# Patient Record
Sex: FEMALE | Race: WHITE
[De-identification: names, ages, dates, MRNs, and addresses within clinical notes are randomized per-mention and may not be internally consistent; named-entity substitution may affect disease eponyms.]

---

## 2020-01-12 ENCOUNTER — HOSPITAL ENCOUNTER (EMERGENCY)
Dept: HOSPITAL 47 - EC | Age: 35
Discharge: HOME | End: 2020-01-12
Payer: COMMERCIAL

## 2020-01-12 VITALS
DIASTOLIC BLOOD PRESSURE: 77 MMHG | HEART RATE: 92 BPM | TEMPERATURE: 101.7 F | RESPIRATION RATE: 15 BRPM | SYSTOLIC BLOOD PRESSURE: 117 MMHG

## 2020-01-12 DIAGNOSIS — I10: ICD-10-CM

## 2020-01-12 DIAGNOSIS — Z88.1: ICD-10-CM

## 2020-01-12 DIAGNOSIS — Z88.2: ICD-10-CM

## 2020-01-12 DIAGNOSIS — Z88.5: ICD-10-CM

## 2020-01-12 DIAGNOSIS — Z87.891: ICD-10-CM

## 2020-01-12 DIAGNOSIS — B34.9: Primary | ICD-10-CM

## 2020-01-12 LAB
ALBUMIN SERPL-MCNC: 4.2 G/DL (ref 3.5–5)
ALP SERPL-CCNC: 93 U/L (ref 38–126)
ALT SERPL-CCNC: 81 U/L (ref 4–34)
ANION GAP SERPL CALC-SCNC: 10 MMOL/L
AST SERPL-CCNC: 156 U/L (ref 14–36)
BASOPHILS # BLD AUTO: 0 K/UL (ref 0–0.2)
BASOPHILS NFR BLD AUTO: 1 %
BUN SERPL-SCNC: 10 MG/DL (ref 7–17)
CALCIUM SPEC-MCNC: 9 MG/DL (ref 8.4–10.2)
CHLORIDE SERPL-SCNC: 104 MMOL/L (ref 98–107)
CO2 SERPL-SCNC: 22 MMOL/L (ref 22–30)
EOSINOPHIL # BLD AUTO: 0.2 K/UL (ref 0–0.7)
EOSINOPHIL NFR BLD AUTO: 3 %
ERYTHROCYTE [DISTWIDTH] IN BLOOD BY AUTOMATED COUNT: 4.88 M/UL (ref 3.8–5.4)
ERYTHROCYTE [DISTWIDTH] IN BLOOD: 13 % (ref 11.5–15.5)
GLUCOSE SERPL-MCNC: 119 MG/DL (ref 74–99)
HCT VFR BLD AUTO: 39.6 % (ref 34–46)
HGB BLD-MCNC: 12.6 GM/DL (ref 11.4–16)
LYMPHOCYTES # SPEC AUTO: 0.2 K/UL (ref 1–4.8)
LYMPHOCYTES NFR SPEC AUTO: 4 %
MCH RBC QN AUTO: 25.8 PG (ref 25–35)
MCHC RBC AUTO-ENTMCNC: 31.8 G/DL (ref 31–37)
MCV RBC AUTO: 81.1 FL (ref 80–100)
MONOCYTES # BLD AUTO: 0.2 K/UL (ref 0–1)
MONOCYTES NFR BLD AUTO: 2 %
NEUTROPHILS # BLD AUTO: 6 K/UL (ref 1.3–7.7)
NEUTROPHILS NFR BLD AUTO: 90 %
PH UR: 6.5 [PH] (ref 5–8)
PLATELET # BLD AUTO: 243 K/UL (ref 150–450)
POTASSIUM SERPL-SCNC: 4.1 MMOL/L (ref 3.5–5.1)
PROT SERPL-MCNC: 7.3 G/DL (ref 6.3–8.2)
SODIUM SERPL-SCNC: 136 MMOL/L (ref 137–145)
SP GR UR: 1 (ref 1–1.03)
UROBILINOGEN UR QL STRIP: <2 MG/DL (ref ?–2)
WBC # BLD AUTO: 6.7 K/UL (ref 3.8–10.6)

## 2020-01-12 PROCEDURE — 83605 ASSAY OF LACTIC ACID: CPT

## 2020-01-12 PROCEDURE — 87040 BLOOD CULTURE FOR BACTERIA: CPT

## 2020-01-12 PROCEDURE — 71046 X-RAY EXAM CHEST 2 VIEWS: CPT

## 2020-01-12 PROCEDURE — 85025 COMPLETE CBC W/AUTO DIFF WBC: CPT

## 2020-01-12 PROCEDURE — 87186 SC STD MICRODIL/AGAR DIL: CPT

## 2020-01-12 PROCEDURE — 81003 URINALYSIS AUTO W/O SCOPE: CPT

## 2020-01-12 PROCEDURE — 36415 COLL VENOUS BLD VENIPUNCTURE: CPT

## 2020-01-12 PROCEDURE — 81025 URINE PREGNANCY TEST: CPT

## 2020-01-12 PROCEDURE — 86664 EPSTEIN-BARR NUCLEAR ANTIGEN: CPT

## 2020-01-12 PROCEDURE — 96375 TX/PRO/DX INJ NEW DRUG ADDON: CPT

## 2020-01-12 PROCEDURE — 86709 HEPATITIS A IGM ANTIBODY: CPT

## 2020-01-12 PROCEDURE — 99284 EMERGENCY DEPT VISIT MOD MDM: CPT

## 2020-01-12 PROCEDURE — 86663 EPSTEIN-BARR ANTIBODY: CPT

## 2020-01-12 PROCEDURE — 96361 HYDRATE IV INFUSION ADD-ON: CPT

## 2020-01-12 PROCEDURE — 86665 EPSTEIN-BARR CAPSID VCA: CPT

## 2020-01-12 PROCEDURE — 96374 THER/PROPH/DIAG INJ IV PUSH: CPT

## 2020-01-12 PROCEDURE — 80053 COMPREHEN METABOLIC PANEL: CPT

## 2020-01-12 PROCEDURE — 80074 ACUTE HEPATITIS PANEL: CPT

## 2020-01-12 PROCEDURE — 87077 CULTURE AEROBIC IDENTIFY: CPT

## 2020-01-12 PROCEDURE — 87502 INFLUENZA DNA AMP PROBE: CPT

## 2020-01-12 NOTE — ED
Abdominal Pain HPI





- General


Chief Complaint: Abdominal Pain


Stated Complaint: Urogenital


Time Seen by Provider: 01/12/20 19:55


Source: patient


Mode of arrival: ambulatory


Limitations: no limitations





- History of Present Illness


Initial Comments: 





Patient is a 34-year-old female presents emergency Department with complaints of

lower back discomfort as well as a fever.  Patient states she has been 

experiencing UTI-type symptoms for the past week and a half.  She did go to her 

doctor a week ago and was given a prescription for Bactrim however she is AL

LERGIC to it.  Patient states they did call her in Macrobid.  She has been 

taking Macrobid for 2 days and feels like her symptoms are worsening.  Patient 

developed a fever today.  She is also complaining of nausea, fatigue, body 

aches.  She has had history of kidney infections, no stones.  She denies any 

belly pain, vomiting.  She has no other complaints at this time.  Upon arrival 

to the ER, Patient was febrile to 102.6, pulse is 106, /89, 99% on room 

air.





- Related Data


                                  Previous Rx's











 Medication  Instructions  Recorded


 


Fluticasone Propionate [Flonase] 2 spray EA NOSTRIL DAILY #1 pkg 07/17/14


 


Ibuprofen [Motrin] 600 mg PO Q6HR PRN #20 tab 07/17/14











                                    Allergies











Allergy/AdvReac Type Severity Reaction Status Date / Time


 


ciprofloxacin [From Cipro] Allergy  Dyspnea Verified 01/12/20 19:48


 


ciprofloxacin HCl Allergy  Dyspnea Verified 01/12/20 19:48





[From Cipro]     


 


codeine Allergy  Rash/Hives Verified 01/12/20 19:48


 


hydrocodone Allergy  Dyspnea Verified 01/12/20 19:48


 


sulfamethoxazole Allergy  Swelling Verified 01/12/20 19:49





[From Bactrim]     


 


tramadol Allergy  Rash/Hives Verified 01/12/20 19:48


 


trimethoprim [From Bactrim] Allergy  Swelling Verified 01/12/20 19:49














Review of Systems


ROS Statement: 


Those systems with pertinent positive or pertinent negative responses have been 

documented in the HPI.





ROS Other: All systems not noted in ROS Statement are negative.





Past Medical History


Past Medical History: Hypertension


Additional Past Medical History / Comment(s): kidney infection


History of Any Multi-Drug Resistant Organisms: None Reported


Past Surgical History: Tubal Ligation


Past Psychological History: No Psychological Hx Reported


Smoking Status: Former smoker


Past Alcohol Use History: None Reported


Past Drug Use History: None Reported





General Exam





- General Exam Comments


Initial Comments: 





GENERAL: 


Patient looks fatigued, no acute distress.





HEAD: 


Atraumatic, normocephalic.





EYES:


Pupils equal round and reactive to light, extraocular movements intact, sclera 

anicteric, conjunctiva are normal.





ENT: 


TMs normal, nares patent, oropharynx clear without exudates.  Moist mucous 

membranes.





NECK: 


Normal range of motion, supple without lymphadenopathy or JVD.





LUNGS:


 Breath sounds clear to auscultation bilaterally and equal.  No wheezes rales or

rhonchi.





HEART:


Regular rate and rhythm without murmurs, rubs or gallops.





ABDOMEN: 


Soft, nontender, normoactive bowel sounds.  No guarding, no rebound.  No masses 

appreciated.  Mild bilateral lower back discomfort.





: Deferred 





EXTREMITIES: 


Normal range of motion, no pitting or edema.  No clubbing or cyanosis.





NEUROLOGICAL: 


Normal speech, normal gait.





PSYCH:


Normal mood, normal affect.





SKIN:


 Warm, Dry, normal turgor, no rashes or lesions noted.


Limitations: no limitations





Course


                                   Vital Signs











  01/12/20 01/12/20 01/12/20





  19:45 21:19 22:16


 


Temperature 102.6 F H 103.2 F H 101.7 F H


 


Pulse Rate 106 H 96 92


 


Respiratory 18 16 15





Rate   


 


Blood Pressure 138/89  117/77


 


O2 Sat by Pulse 99 98 99





Oximetry   














Medical Decision Making





- Medical Decision Making





Patient is a 34-year-old female presenting with lower back pain and UTI-type 

symptoms for the past one week.  Patient was started on Macrobid by her PCP but 

symptoms have increased.  Patient arrives to the ER today febrile and tachycar

rachael. Lab work is unremarkable, lactic acid is normal, UA is normal.  Liver 

enzymes are slightly elevated.  Influenza is negative.  Chest x-ray shows no 

acute abnormalities.  Patient was given fluids, Zofran, Toradol and reports some

improvement in her symptoms.  Her fever did come down some.  On reexamination, 

patient still has no belly pain.  I discussed with patient that this is most 

likely viral in nature.  Patient will continue with Motrin or Tylenol as needed 

for fever control.  She will continue to increase fluid intake.  She is in 

agreement with this plan and care.  Patient is stable for discharge at this 

time.  She'll follow up with her PCP if symptoms persist as well as repeat labs 

for her liver enzymes.  Return parameters were discussed with the patient she 

verbalized understanding.  Case discussed with Dr. Dhillon. 





- Lab Data


Result diagrams: 


                                 01/12/20 20:45





                                 01/12/20 20:45


                                   Lab Results











  01/12/20 01/12/20 01/12/20 Range/Units





  20:45 20:45 20:45 


 


WBC  6.7    (3.8-10.6)  k/uL


 


RBC  4.88    (3.80-5.40)  m/uL


 


Hgb  12.6    (11.4-16.0)  gm/dL


 


Hct  39.6    (34.0-46.0)  %


 


MCV  81.1    (80.0-100.0)  fL


 


MCH  25.8    (25.0-35.0)  pg


 


MCHC  31.8    (31.0-37.0)  g/dL


 


RDW  13.0    (11.5-15.5)  %


 


Plt Count  243    (150-450)  k/uL


 


Neutrophils %  90    %


 


Lymphocytes %  4    %


 


Monocytes %  2    %


 


Eosinophils %  3    %


 


Basophils %  1    %


 


Neutrophils #  6.0    (1.3-7.7)  k/uL


 


Lymphocytes #  0.2 L    (1.0-4.8)  k/uL


 


Monocytes #  0.2    (0-1.0)  k/uL


 


Eosinophils #  0.2    (0-0.7)  k/uL


 


Basophils #  0.0    (0-0.2)  k/uL


 


Hypochromasia  Slight    


 


Sodium   136 L   (137-145)  mmol/L


 


Potassium   4.1   (3.5-5.1)  mmol/L


 


Chloride   104   ()  mmol/L


 


Carbon Dioxide   22   (22-30)  mmol/L


 


Anion Gap   10   mmol/L


 


BUN   10   (7-17)  mg/dL


 


Creatinine   0.79   (0.52-1.04)  mg/dL


 


Est GFR (CKD-EPI)AfAm   >90   (>60 ml/min/1.73 sqM)  


 


Est GFR (CKD-EPI)NonAf   >90   (>60 ml/min/1.73 sqM)  


 


Glucose   119 H   (74-99)  mg/dL


 


Plasma Lactic Acid Aaron    1.3  (0.7-2.0)  mmol/L


 


Calcium   9.0   (8.4-10.2)  mg/dL


 


Total Bilirubin   0.4   (0.2-1.3)  mg/dL


 


AST   156 H   (14-36)  U/L


 


ALT   81 H   (4-34)  U/L


 


Alkaline Phosphatase   93   ()  U/L


 


Total Protein   7.3   (6.3-8.2)  g/dL


 


Albumin   4.2   (3.5-5.0)  g/dL


 


Urine Color     


 


Urine Appearance     (Clear)  


 


Urine pH     (5.0-8.0)  


 


Ur Specific Gravity     (1.001-1.035)  


 


Urine Protein     (Negative)  


 


Urine Glucose (UA)     (Negative)  


 


Urine Ketones     (Negative)  


 


Urine Blood     (Negative)  


 


Urine Nitrite     (Negative)  


 


Urine Bilirubin     (Negative)  


 


Urine Urobilinogen     (<2.0)  mg/dL


 


Ur Leukocyte Esterase     (Negative)  


 


Urine HCG, Qual     (Not Detectd)  


 


Influenza Type A RNA     (Not Detectd)  


 


Influenza Type B (PCR)     (Not Detectd)  














  01/12/20 01/12/20 01/12/20 Range/Units





  20:50 20:50 21:20 


 


WBC     (3.8-10.6)  k/uL


 


RBC     (3.80-5.40)  m/uL


 


Hgb     (11.4-16.0)  gm/dL


 


Hct     (34.0-46.0)  %


 


MCV     (80.0-100.0)  fL


 


MCH     (25.0-35.0)  pg


 


MCHC     (31.0-37.0)  g/dL


 


RDW     (11.5-15.5)  %


 


Plt Count     (150-450)  k/uL


 


Neutrophils %     %


 


Lymphocytes %     %


 


Monocytes %     %


 


Eosinophils %     %


 


Basophils %     %


 


Neutrophils #     (1.3-7.7)  k/uL


 


Lymphocytes #     (1.0-4.8)  k/uL


 


Monocytes #     (0-1.0)  k/uL


 


Eosinophils #     (0-0.7)  k/uL


 


Basophils #     (0-0.2)  k/uL


 


Hypochromasia     


 


Sodium     (137-145)  mmol/L


 


Potassium     (3.5-5.1)  mmol/L


 


Chloride     ()  mmol/L


 


Carbon Dioxide     (22-30)  mmol/L


 


Anion Gap     mmol/L


 


BUN     (7-17)  mg/dL


 


Creatinine     (0.52-1.04)  mg/dL


 


Est GFR (CKD-EPI)AfAm     (>60 ml/min/1.73 sqM)  


 


Est GFR (CKD-EPI)NonAf     (>60 ml/min/1.73 sqM)  


 


Glucose     (74-99)  mg/dL


 


Plasma Lactic Acid Aaron     (0.7-2.0)  mmol/L


 


Calcium     (8.4-10.2)  mg/dL


 


Total Bilirubin     (0.2-1.3)  mg/dL


 


AST     (14-36)  U/L


 


ALT     (4-34)  U/L


 


Alkaline Phosphatase     ()  U/L


 


Total Protein     (6.3-8.2)  g/dL


 


Albumin     (3.5-5.0)  g/dL


 


Urine Color   Light Yellow   


 


Urine Appearance   Clear   (Clear)  


 


Urine pH   6.5   (5.0-8.0)  


 


Ur Specific Gravity   1.004   (1.001-1.035)  


 


Urine Protein   Negative   (Negative)  


 


Urine Glucose (UA)   Negative   (Negative)  


 


Urine Ketones   Negative   (Negative)  


 


Urine Blood   Negative   (Negative)  


 


Urine Nitrite   Negative   (Negative)  


 


Urine Bilirubin   Negative   (Negative)  


 


Urine Urobilinogen   <2.0   (<2.0)  mg/dL


 


Ur Leukocyte Esterase   Negative   (Negative)  


 


Urine HCG, Qual  Not Detected    (Not Detectd)  


 


Influenza Type A RNA    Not Detected  (Not Detectd)  


 


Influenza Type B (PCR)    Not Detected  (Not Detectd)  














Disposition


Clinical Impression: 


 Fever, Viral illness





Disposition: HOME SELF-CARE


Condition: Stable


Instructions (If sedation given, give patient instructions):  Fever in Adults (E

D)


Additional Instructions: 


Please return to the Emergency Department if symptoms worsen or any other 

concerns.


Continue with ibuprofen for fever control.  Follow-up with PCP in 1-3 days if 

symptoms are persisting.


Is patient prescribed a controlled substance at d/c from ED?: No


Referrals: 


Peter Echevarria DO [Primary Care Provider] - 1-2 days

## 2020-01-12 NOTE — XR
EXAMINATION TYPE: XR chest 2V

 

DATE OF EXAM: 1/12/2020

 

COMPARISON: NONE

 

HISTORY: Cough and fever

 

TECHNIQUE:

 

FINDINGS: Heart and mediastinum are normal. Lungs are clear. Diaphragm is normal. Bony thorax appears
 normal. Pulmonary vascularity is normal.

 

IMPRESSION: Normal chest. Normal heart.

## 2020-01-14 LAB
EBV EA IGG SER-ACNC: <0.2 AI
EBV NA IGG SER-ACNC: >8 AI
EBV VCA IGG SER IA-ACNC: >8 AI
EBV VCA IGM SP2 SER QL: <0.2 AI

## 2020-01-27 ENCOUNTER — HOSPITAL ENCOUNTER (OUTPATIENT)
Dept: HOSPITAL 47 - LABWHC1 | Age: 35
Discharge: HOME | End: 2020-01-27
Attending: FAMILY MEDICINE
Payer: COMMERCIAL

## 2020-01-27 DIAGNOSIS — R94.5: Primary | ICD-10-CM

## 2020-01-27 DIAGNOSIS — R78.81: ICD-10-CM

## 2020-01-27 LAB
ALT SERPL-CCNC: 42 U/L (ref 8–44)
AST SERPL-CCNC: 22 U/L (ref 13–35)

## 2020-01-27 PROCEDURE — 87040 BLOOD CULTURE FOR BACTERIA: CPT

## 2020-01-27 PROCEDURE — 36415 COLL VENOUS BLD VENIPUNCTURE: CPT

## 2020-01-27 PROCEDURE — 84450 TRANSFERASE (AST) (SGOT): CPT

## 2020-01-27 PROCEDURE — 84460 ALANINE AMINO (ALT) (SGPT): CPT

## 2020-02-11 ENCOUNTER — HOSPITAL ENCOUNTER (OUTPATIENT)
Dept: HOSPITAL 47 - ORWHC2ENDO | Age: 35
Discharge: HOME | End: 2020-02-11
Attending: SURGERY
Payer: COMMERCIAL

## 2020-02-11 VITALS — BODY MASS INDEX: 26.2 KG/M2

## 2020-02-11 VITALS — DIASTOLIC BLOOD PRESSURE: 82 MMHG | RESPIRATION RATE: 16 BRPM | HEART RATE: 62 BPM | SYSTOLIC BLOOD PRESSURE: 118 MMHG

## 2020-02-11 VITALS — TEMPERATURE: 99.3 F

## 2020-02-11 DIAGNOSIS — Z87.440: ICD-10-CM

## 2020-02-11 DIAGNOSIS — K29.80: ICD-10-CM

## 2020-02-11 DIAGNOSIS — Z79.899: ICD-10-CM

## 2020-02-11 DIAGNOSIS — Z88.2: ICD-10-CM

## 2020-02-11 DIAGNOSIS — I10: ICD-10-CM

## 2020-02-11 DIAGNOSIS — Z87.891: ICD-10-CM

## 2020-02-11 DIAGNOSIS — K44.9: ICD-10-CM

## 2020-02-11 DIAGNOSIS — Z88.5: ICD-10-CM

## 2020-02-11 DIAGNOSIS — K29.50: Primary | ICD-10-CM

## 2020-02-11 DIAGNOSIS — K21.0: ICD-10-CM

## 2020-02-11 DIAGNOSIS — Z98.890: ICD-10-CM

## 2020-02-11 DIAGNOSIS — Z98.51: ICD-10-CM

## 2020-02-11 DIAGNOSIS — Z88.1: ICD-10-CM

## 2020-02-11 PROCEDURE — 88305 TISSUE EXAM BY PATHOLOGIST: CPT

## 2020-02-11 PROCEDURE — 43239 EGD BIOPSY SINGLE/MULTIPLE: CPT

## 2020-02-11 PROCEDURE — 81025 URINE PREGNANCY TEST: CPT

## 2020-02-11 NOTE — P.PCN
Date of Procedure: 02/11/20


Procedure(s) Performed: 


Preoperative Dx: GERD


Postoperative Dx: Duodenitis, gastritis, small hiatal hernia


Procedure: EGD with Bx


Anesthesia: Sedation


Endoscopist: Dr. Santos


Specimens: Duodenum, antrum


Endoscopic Procedure:   The patient was on the endoscopy table in the left 

decubitus position.  The Olympus gastroscope was inserted into the oropharynx 

and passed under direct visualization to the region of the third portion of the 

duodenum.  From that point the scope was slowly withdrawn inspecting all 

surfaces carefully.  There was noted to be mild duodenitis.  Biopsies were 

taken.  No ulcers were seen.  The pylorus was widely patent.  The stomach was 

carefully inspected.  There was a straight is present.  A biopsy of the antrum 

took place to rule out H. pylori.  Retroflexion revealed a small sliding hiatal 

hernia.  The esophagus was then carefully examined.  There were no neoplastic 

inflammatory or polypoid lesions throughout the visualized esophagus.  The 

patient was then taken to the recovery room in stable condition per anesthesia 

guidelines.


Recommendations: Await biopsies results.  Begin antiacid therapy.  Follow-up in 

the office if symptoms persist.

## 2020-02-11 NOTE — P.GSHP
History of Present Illness


H&P Date: 02/11/20


Chief Complaint: GERD





Patient here today for upper endoscopy.  Patient's complaints of heartburn.  

Some nausea times.  Intermittent diarrhea.  No significant abdominal pain.  She 

tried Nexium over-the-counter for 2 weeks with mild improvement.





Past Medical History


Past Medical History: GERD/Reflux, Hypertension


Additional Past Medical History / Comment(s): kidney infection


History of Any Multi-Drug Resistant Organisms: None Reported


Past Surgical History: Tubal Ligation


Additional Past Surgical History / Comment(s): wisdom teeth


Past Anesthesia/Blood Transfusion Reactions: No Reported Reaction


Smoking Status: Former smoker





Medications and Allergies


                                Home Medications











 Medication  Instructions  Recorded  Confirmed  Type


 


Losartan Potassium [Cozaar] 100 mg PO DAILY 02/10/20 02/11/20 History








                                    Allergies











Allergy/AdvReac Type Severity Reaction Status Date / Time


 


ciprofloxacin [From Cipro] Allergy  Dyspnea Verified 02/11/20 12:48


 


ciprofloxacin HCl Allergy  Dyspnea Verified 02/11/20 12:48





[From Cipro]     


 


codeine Allergy  Rash/Hives Verified 02/11/20 12:48


 


hydrocodone Allergy  Dyspnea Verified 02/11/20 12:48


 


nitrofurantoin Allergy  Dyspnea Verified 02/11/20 12:48





[From Macrobid]     


 


sulfamethoxazole Allergy  Swelling Verified 02/11/20 12:48





[From Bactrim]     


 


tramadol Allergy  Rash/Hives Verified 02/11/20 12:48


 


trimethoprim [From Bactrim] Allergy  Swelling Verified 02/11/20 12:48














Surgical - Exam


                                   Vital Signs











Pulse Resp BP Pulse Ox


 


 71   18   118/70   100 


 


 02/11/20 12:45  02/11/20 12:45  02/11/20 12:45  02/11/20 12:45

















Physical exam:


General: Well-developed, well-nourished


HEENT: Normocephalic, sclerae nonicteric


Abdomen: Nontender, nondistended


Extremities: No edema


Neuro: Alert and oriented





Assessment and Plan


(1) GERD (gastroesophageal reflux disease)


Narrative/Plan: 


Will proceed with upper endoscopy


Current Visit: Yes   Status: Acute   Code(s): K21.9 - GASTRO-ESOPHAGEAL REFLUX 

DISEASE WITHOUT ESOPHAGITIS   SNOMED Code(s): 837037444

## 2020-08-14 ENCOUNTER — HOSPITAL ENCOUNTER (EMERGENCY)
Dept: HOSPITAL 47 - EC | Age: 35
LOS: 1 days | Discharge: HOME | End: 2020-08-15
Payer: COMMERCIAL

## 2020-08-14 VITALS — TEMPERATURE: 98.6 F

## 2020-08-14 DIAGNOSIS — Z88.2: ICD-10-CM

## 2020-08-14 DIAGNOSIS — Z79.899: ICD-10-CM

## 2020-08-14 DIAGNOSIS — I10: ICD-10-CM

## 2020-08-14 DIAGNOSIS — Z88.1: ICD-10-CM

## 2020-08-14 DIAGNOSIS — Z88.5: ICD-10-CM

## 2020-08-14 DIAGNOSIS — N39.0: Primary | ICD-10-CM

## 2020-08-14 PROCEDURE — 81001 URINALYSIS AUTO W/SCOPE: CPT

## 2020-08-14 PROCEDURE — 81025 URINE PREGNANCY TEST: CPT

## 2020-08-14 PROCEDURE — 99283 EMERGENCY DEPT VISIT LOW MDM: CPT

## 2020-08-15 VITALS — SYSTOLIC BLOOD PRESSURE: 137 MMHG | HEART RATE: 63 BPM | DIASTOLIC BLOOD PRESSURE: 98 MMHG | RESPIRATION RATE: 16 BRPM

## 2020-08-15 LAB
PH UR: 8 [PH] (ref 5–8)
RBC UR QL: 1 /HPF (ref 0–5)
SP GR UR: 1 (ref 1–1.03)
SQUAMOUS UR QL AUTO: <1 /HPF (ref 0–4)
UROBILINOGEN UR QL STRIP: <2 MG/DL (ref ?–2)
WBC # UR AUTO: 7 /HPF (ref 0–5)

## 2020-08-15 NOTE — ED
General Adult HPI





- General


Chief complaint: Urogenital


Stated complaint: Urogenital female


Source: patient, RN notes reviewed


Mode of arrival: ambulatory


Limitations: no limitations





- History of Present Illness


Initial comments: 





35-year-old female with a past medical history of kidney infection, hypertension

presents to the emergency room for a chief complaint of dysuria.  Patient has 

had burning with urination for the past day.  Patient states she feels like she 

has a bladder infection which she has had in the past.  Patient denies any back 

pain.  Denies any nausea vomiting.  Denies fevers or chills.Patient has no other

complaints at this time including shortness of breath, chest pain, abdominal 

pain, nausea or vomiting, headache, or visual changes.





- Related Data


                                Home Medications











 Medication  Instructions  Recorded  Confirmed


 


Losartan Potassium [Cozaar] 100 mg PO DAILY 02/10/20 02/11/20








                                  Previous Rx's











 Medication  Instructions  Recorded


 


Omeprazole [PriLOSEC] 20 mg PO AC-BRKFST #90 cap 02/11/20


 


Sucralfate [Carafate] 1 gm PO ACHS #120 tab 02/11/20


 


Cephalexin [Keflex] 500 mg PO Q12HR #20 cap 08/15/20











                                    Allergies











Allergy/AdvReac Type Severity Reaction Status Date / Time


 


ciprofloxacin [From Cipro] Allergy  Dyspnea Verified 08/14/20 23:39


 


ciprofloxacin HCl Allergy  Dyspnea Verified 08/14/20 23:39





[From Cipro]     


 


codeine Allergy  Rash/Hives Verified 08/14/20 23:39


 


hydrocodone Allergy  Dyspnea Verified 08/14/20 23:39


 


nitrofurantoin Allergy  Dyspnea Verified 08/14/20 23:39





[From Macrobid]     


 


sulfamethoxazole Allergy  Swelling Verified 08/14/20 23:39





[From Bactrim]     


 


tramadol Allergy  Rash/Hives Verified 08/14/20 23:39


 


trimethoprim [From Bactrim] Allergy  Swelling Verified 08/14/20 23:39














Review of Systems


ROS Statement: 


Those systems with pertinent positive or pertinent negative responses have been 

documented in the HPI.





ROS Other: All systems not noted in ROS Statement are negative.





Past Medical History


Past Medical History: GERD/Reflux, Hypertension


Additional Past Medical History / Comment(s): kidney infection


History of Any Multi-Drug Resistant Organisms: None Reported


Past Surgical History: Tubal Ligation


Additional Past Surgical History / Comment(s): wisdom teeth


Past Anesthesia/Blood Transfusion Reactions: No Reported Reaction


Past Psychological History: No Psychological Hx Reported


Smoking Status: Never smoker


Past Alcohol Use History: None Reported


Past Drug Use History: None Reported





General Exam


Limitations: no limitations


General appearance: alert, in no apparent distress


Head exam: Present: atraumatic, normocephalic, normal inspection


Eye exam: Present: normal appearance, PERRL, EOMI.  Absent: scleral icterus, 

conjunctival injection, periorbital swelling


ENT exam: Present: normal exam, mucous membranes moist


Neck exam: Present: normal inspection, full ROM.  Absent: tenderness, 

meningismus, lymphadenopathy


Respiratory exam: Present: normal lung sounds bilaterally.  Absent: respiratory 

distress, wheezes, rales, rhonchi, stridor


Cardiovascular Exam: Present: regular rate, normal rhythm, normal heart sounds. 

Absent: systolic murmur, diastolic murmur, rubs, gallop, clicks


GI/Abdominal exam: Present: soft, normal bowel sounds.  Absent: distended, 

tenderness, guarding, rebound, rigid


Back exam: Absent: CVA tenderness (R), CVA tenderness (L)





Course


                                   Vital Signs











  08/14/20 08/15/20





  23:34 01:32


 


Temperature 98.6 F 98.6 F


 


Pulse Rate 73 63


 


Respiratory 20 16





Rate  


 


Blood Pressure 147/87 137/98


 


O2 Sat by Pulse 100 100





Oximetry  














Medical Decision Making





- Medical Decision Making





patient has 7 white blood cells however is symptomatic and feels like previous 

bladder infections.  No upper back pain or fevers.  No sign of pyelonephritis.  

At this time patient will be treated with Keflex as she has several ALLERGIES to

other medications.  She believes she has had this in the past.  She will return 

for any worsening symptoms which were discussed





- Lab Data


                                   Lab Results











  08/14/20 08/14/20 Range/Units





  23:45 23:45 


 


Urine Color   Yellow  


 


Urine Appearance   Clear  (Clear)  


 


Urine pH   8.0  (5.0-8.0)  


 


Ur Specific Gravity   1.000 L  (1.001-1.035)  


 


Urine Protein   Negative  (Negative)  


 


Urine Glucose (UA)   Negative  (Negative)  


 


Urine Ketones   Negative  (Negative)  


 


Urine Blood   Large  (Negative)  


 


Urine Nitrite   Negative  (Negative)  


 


Urine Bilirubin   Negative  (Negative)  


 


Urine Urobilinogen   <2.0  (<2.0)  mg/dL


 


Ur Leukocyte Esterase   Large  (Negative)  


 


Urine RBC   1  (0-5)  /hpf


 


Urine WBC   7 H  (0-5)  /hpf


 


Ur Squamous Epith Cells   <1  (0-4)  /hpf


 


Urine Mucus   Rare H  (None)  /hpf


 


Urine HCG, Qual  Not Detected   (Not Detectd)  














Disposition


Clinical Impression: 


 Urinary tract infection





Disposition: HOME SELF-CARE


Condition: Good


Instructions (If sedation given, give patient instructions):  Urinary Tract 

Infection in Women (ED)


Prescriptions: 


Cephalexin [Keflex] 500 mg PO Q12HR #20 cap


Is patient prescribed a controlled substance at d/c from ED?: No


Referrals: 


Peter Echevarria DO [Primary Care Provider] - 1-2 days


Time of Disposition: 01:16

## 2023-08-25 ENCOUNTER — HOSPITAL ENCOUNTER (OUTPATIENT)
Dept: HOSPITAL 47 - LABPAT | Age: 38
Discharge: HOME | End: 2023-08-25
Attending: OBSTETRICS & GYNECOLOGY
Payer: COMMERCIAL

## 2023-08-25 DIAGNOSIS — Z01.812: Primary | ICD-10-CM

## 2023-08-25 PROCEDURE — 85025 COMPLETE CBC W/AUTO DIFF WBC: CPT

## 2023-08-26 LAB
BASOPHILS # BLD AUTO: 0.08 X 10*3/UL (ref 0–0.1)
BASOPHILS NFR BLD AUTO: 0.9 %
EOSINOPHIL # BLD AUTO: 0.24 X 10*3/UL (ref 0.04–0.35)
EOSINOPHIL NFR BLD AUTO: 2.7 %
ERYTHROCYTE [DISTWIDTH] IN BLOOD BY AUTOMATED COUNT: 4.73 X 10*6/UL (ref 4.1–5.2)
ERYTHROCYTE [DISTWIDTH] IN BLOOD: 17 % (ref 11.5–14.5)
HCT VFR BLD AUTO: 36.2 % (ref 37.2–46.3)
HGB BLD-MCNC: 10.2 D/DL (ref 12–15)
IMM GRANULOCYTES BLD QL AUTO: 0.2 %
LYMPHOCYTES # SPEC AUTO: 2.79 X 10*3/UL (ref 0.9–5)
LYMPHOCYTES NFR SPEC AUTO: 31.3 %
MCH RBC QN AUTO: 21.6 PG (ref 27–32)
MCHC RBC AUTO-ENTMCNC: 28.2 D/DL (ref 32–37)
MCV RBC AUTO: 76.5 FL (ref 80–97)
MONOCYTES # BLD AUTO: 0.83 X 10*3/UL (ref 0.2–1)
MONOCYTES NFR BLD AUTO: 9.3 %
NEUTROPHILS # BLD AUTO: 4.95 X 10*3/UL (ref 1.8–7.7)
NEUTROPHILS NFR BLD AUTO: 55.6 %
NRBC BLD AUTO-RTO: 0 X 10*3/UL (ref 0–0.01)
PLATELET # BLD AUTO: 411 X 10*3/UL (ref 140–440)
WBC # BLD AUTO: 8.91 X 10*3/UL (ref 4.5–10)